# Patient Record
Sex: FEMALE | Race: WHITE | NOT HISPANIC OR LATINO | Employment: FULL TIME | URBAN - METROPOLITAN AREA
[De-identification: names, ages, dates, MRNs, and addresses within clinical notes are randomized per-mention and may not be internally consistent; named-entity substitution may affect disease eponyms.]

---

## 2024-11-04 ENCOUNTER — OFFICE VISIT (OUTPATIENT)
Dept: FAMILY MEDICINE CLINIC | Facility: CLINIC | Age: 29
End: 2024-11-04
Payer: COMMERCIAL

## 2024-11-04 VITALS
TEMPERATURE: 98.5 F | WEIGHT: 254.9 LBS | RESPIRATION RATE: 16 BRPM | OXYGEN SATURATION: 96 % | HEART RATE: 79 BPM | HEIGHT: 65 IN | BODY MASS INDEX: 42.47 KG/M2

## 2024-11-04 DIAGNOSIS — Z86.39 HISTORY OF IRON DEFICIENCY: ICD-10-CM

## 2024-11-04 DIAGNOSIS — H92.03 EAR PAIN, BILATERAL: ICD-10-CM

## 2024-11-04 DIAGNOSIS — Z00.00 ENCOUNTER FOR MEDICAL EXAMINATION TO ESTABLISH CARE: Primary | ICD-10-CM

## 2024-11-04 DIAGNOSIS — F41.1 GENERALIZED ANXIETY DISORDER: ICD-10-CM

## 2024-11-04 DIAGNOSIS — F33.0 MILD EPISODE OF RECURRENT MAJOR DEPRESSIVE DISORDER: ICD-10-CM

## 2024-11-04 RX ORDER — FAMOTIDINE 20 MG/1
20 TABLET, FILM COATED ORAL
COMMUNITY
End: 2024-11-04

## 2024-11-04 RX ORDER — FLUTICASONE PROPIONATE 50 MCG
1 SPRAY, SUSPENSION (ML) NASAL
COMMUNITY
End: 2024-11-04

## 2024-11-04 RX ORDER — OMEPRAZOLE 40 MG/1
40 CAPSULE, DELAYED RELEASE ORAL DAILY
COMMUNITY

## 2024-11-04 RX ORDER — CETIRIZINE HYDROCHLORIDE 10 MG/1
10 TABLET ORAL DAILY
COMMUNITY

## 2024-11-04 RX ORDER — HYOSCYAMINE SULFATE 0.125 MG
125 TABLET ORAL EVERY 4 HOURS PRN
COMMUNITY

## 2024-11-04 RX ORDER — ESCITALOPRAM OXALATE 10 MG/1
10 TABLET ORAL DAILY
COMMUNITY
Start: 2024-04-25 | End: 2024-11-04

## 2024-11-10 NOTE — PROGRESS NOTES
"Chief Complaint  Establish Care, Anxiety (LEXAPRO 10 MG, NOT HELPING. ), Depression, Ear Fullness, and Earache    Subjective    History of Present Illness  The patient is a 29-year-old female here for her first visit to establish medical care.    She has a history of allergies and is currently taking Zyrtec. Despite receiving allergy injections, she continues to experience ear discomfort, which is thought to be allergy-related. She has also experienced sharp, dull pain in both ears for the past week and a half, along with fluid accumulation and mild congestion. She has used Flonase in the past.    She reports difficulty swallowing food and was prescribed hyoscyamine by her gastroenterologist, Dr. Roberts, for stomach spasms. Her stomach issues are now under control with the medication. She does not report any abdominal pain, nausea, vomiting, urinary or bowel issues, or leg swelling.    She has a history of anxiety and depression. She was previously taking Lexapro 10 mg daily but discontinued it due to lack of effectiveness. She was then prescribed Xanax twice daily but is not currently taking it. She experiences constant anxiety, heart palpitations, restlessness, and racing thoughts. She is currently undergoing EMDR therapy for past trauma. She reports no suicidal ideation or attempts.    She has not had recent lab tests and requests an iron panel due to a history of iron deficiency and previous infusions. She is also taking omeprazole daily for acid reflux.    FAMILY HISTORY  Anxiety runs in her family.       Makayla Zamudio presents to [facility]  Anxiety  Her past medical history is significant for depression.   Depression  Her past medical history is significant for depression.   Ear Fullness     Earache         Objective   Vital Signs:  Pulse 79   Temp 98.5 °F (36.9 °C) (Oral)   Resp 16   Ht 165.1 cm (65\")   Wt 116 kg (254 lb 14.4 oz)   SpO2 96%   BMI 42.42 kg/m² " "  Estimated body mass index is 42.42 kg/m² as calculated from the following:    Height as of this encounter: 165.1 cm (65\").    Weight as of this encounter: 116 kg (254 lb 14.4 oz).    Class 3 Severe Obesity (BMI >=40). Obesity-related health conditions include the following: GERD. Obesity is newly identified. BMI is is above average; BMI management plan is completed. We discussed portion control and increasing exercise.      Physical Exam  Cardiovascular:      Rate and Rhythm: Normal rate.      Pulses: Normal pulses.      Heart sounds: Normal heart sounds.   Pulmonary:      Effort: Pulmonary effort is normal.      Breath sounds: Normal breath sounds.   Skin:     General: Skin is warm.   Neurological:      Mental Status: She is alert and oriented to person, place, and time.        Result Review :                Assessment and Plan   Diagnoses and all orders for this visit:    1. Encounter for medical examination to establish care (Primary)    2. Generalized anxiety disorder  -     FLUoxetine (PROzac) 20 MG capsule; Take 1 capsule by mouth Daily.  Dispense: 90 capsule; Refill: 1  -     CBC Auto Differential; Future  -     Comprehensive Metabolic Panel; Future  -     Lipid Panel With / Chol / HDL Ratio; Future  -     TSH; Future  -     Urinalysis With Microscopic - Urine, Clean Catch; Future    3. Mild episode of recurrent major depressive disorder  -     FLUoxetine (PROzac) 20 MG capsule; Take 1 capsule by mouth Daily.  Dispense: 90 capsule; Refill: 1  -     CBC Auto Differential; Future  -     Comprehensive Metabolic Panel; Future  -     Lipid Panel With / Chol / HDL Ratio; Future  -     TSH; Future  -     Urinalysis With Microscopic - Urine, Clean Catch; Future    4. History of iron deficiency  -     Iron and TIBC; Future  -     Ferritin; Future    5. Ear pain, bilateral        Assessment & Plan  1. Generalized anxiety disorder.  Her symptoms suggest generalized anxiety disorder, characterized by heart palpitations " and constant worry. Citalopram is not recommended due to potential weight gain. Fluoxetine, a weight-neutral drug, is a suitable alternative. A prescription for fluoxetine 20 mg, 90 capsules with one refill, will be provided. She is advised to take this medication for at least 4 weeks to assess its effectiveness. She reports no suicidal thoughts or attempts.    2. Ear pain.  Her ear symptoms, including sharp and dull pain, are likely related to allergies rather than an infection. Examination showed no signs of infection or redness, and her eardrums appeared normal. Flonase nasal spray is recommended to help with her allergy symptoms.    3. Health Maintenance.  Fasting labs, including CBC, CMP, lipid panel, thyroid, urine analysis, TIBC, and ferritin, will be ordered prior to her next visit to monitor her overall health and ensure her iron levels are adequate before returning to [facility].    Follow-up  Return in 3 months for follow up.            Follow Up   No follow-ups on file.  Patient was given instructions and counseling regarding her condition or for health maintenance advice. Please see specific information pulled into the AVS if appropriate.     Patient or patient representative verbalized consent for the use of Ambient Listening during the visit with  Yomi Bedoya MD for chart documentation. 11/10/2024  18:00 EST

## 2024-11-13 ENCOUNTER — PATIENT MESSAGE (OUTPATIENT)
Dept: FAMILY MEDICINE CLINIC | Facility: CLINIC | Age: 29
End: 2024-11-13
Payer: COMMERCIAL

## 2024-12-17 ENCOUNTER — OFFICE VISIT (OUTPATIENT)
Dept: FAMILY MEDICINE CLINIC | Facility: CLINIC | Age: 29
End: 2024-12-17
Payer: COMMERCIAL

## 2024-12-17 VITALS
HEART RATE: 73 BPM | OXYGEN SATURATION: 99 % | RESPIRATION RATE: 16 BRPM | TEMPERATURE: 98.5 F | WEIGHT: 253 LBS | HEIGHT: 65 IN | BODY MASS INDEX: 42.15 KG/M2 | DIASTOLIC BLOOD PRESSURE: 90 MMHG | SYSTOLIC BLOOD PRESSURE: 110 MMHG

## 2024-12-17 DIAGNOSIS — F33.0 MILD EPISODE OF RECURRENT MAJOR DEPRESSIVE DISORDER: ICD-10-CM

## 2024-12-17 DIAGNOSIS — Z30.011 ENCOUNTER FOR ORAL CONTRACEPTION INITIAL PRESCRIPTION: ICD-10-CM

## 2024-12-17 DIAGNOSIS — F41.1 GENERALIZED ANXIETY DISORDER: Primary | ICD-10-CM

## 2024-12-17 PROCEDURE — 99214 OFFICE O/P EST MOD 30 MIN: CPT | Performed by: STUDENT IN AN ORGANIZED HEALTH CARE EDUCATION/TRAINING PROGRAM

## 2024-12-17 RX ORDER — NORGESTIMATE AND ETHINYL ESTRADIOL 7DAYSX3 28
1 KIT ORAL DAILY
Start: 2024-12-17 | End: 2024-12-23

## 2024-12-17 RX ORDER — FLUOXETINE 40 MG/1
40 CAPSULE ORAL DAILY
Qty: 90 CAPSULE | Refills: 3 | Status: SHIPPED | OUTPATIENT
Start: 2024-12-17

## 2024-12-18 LAB — B-HCG SERPL QL: NEGATIVE MIU/ML

## 2024-12-23 RX ORDER — NORGESTIMATE AND ETHINYL ESTRADIOL 7DAYSX3 28
1 KIT ORAL DAILY
Qty: 28 TABLET | Refills: 12 | Status: SHIPPED | OUTPATIENT
Start: 2024-12-23 | End: 2025-12-23

## 2024-12-24 NOTE — PROGRESS NOTES
Chief Complaint  Medication Problem (POSSIBLY CHANGE DOSAGE ON PROZAC.), Menstrual Problem (BLEEDING FOR 3 WEEKS, NEED TO GO BACK ON BIRTH CONTROL PILLS.), and Med Refill (GOING TO [place], NEEDS RX; MALARONE. TO AVOID MALARIA.)    Subjective    History of Present Illness  The patient is a 29-year-old female who presents for evaluation of anxiety, depression, irregular menstrual cycle, and for malaria prophylaxis.    She reports that her current regimen of Prozac 20 mg daily, prescribed for both anxiety and depression, has been beneficial. However, she expresses a desire to increase the dosage slightly. She is not experiencing any side effects from the medication and does not endorse any suicidal ideation. She is not sexually active and has no history of glaucoma or other ocular conditions.    She has been experiencing prolonged menstrual bleeding for the past 3 weeks. She was previously on birth control, which she discontinued due to concerns about potential future fertility issues. She had been on birth control since 2012, initially prescribed by her gynecologist, who has since relocated. She acknowledges that her history of anemia may be contributing to her current symptoms. She reports no history of smoking or thromboembolic events. She was on Tri-Marge birth control for irregular periods. She used to have regular periods while on Tri-Marge.    She is planning a return trip to [place] and is seeking a prescription for Malarone as a prophylactic measure against malaria. Her previous experience with doxycycline was marred by daily nausea, prompting her preference for Malarone, which she has tolerated well in the past. She reports no renal issues.    SOCIAL HISTORY  She does not smoke.    MEDICATIONS  Current: Prozac  Past: Tri-Marge, Malarone, doxycycline       Makayla Zamudio presents to [facility]  Menstrual Problem      Objective   Vital Signs:  /90 (BP Location: Left arm,  "Patient Position: Sitting, Cuff Size: Large Adult)   Pulse 73   Temp 98.5 °F (36.9 °C) (Oral)   Resp 16   Ht 165.1 cm (65\")   Wt 115 kg (253 lb)   SpO2 99%   BMI 42.10 kg/m²   Estimated body mass index is 42.1 kg/m² as calculated from the following:    Height as of this encounter: 165.1 cm (65\").    Weight as of this encounter: 115 kg (253 lb).            Physical Exam  Cardiovascular:      Rate and Rhythm: Normal rate.      Pulses: Normal pulses.      Heart sounds: Normal heart sounds.   Pulmonary:      Effort: Pulmonary effort is normal.      Breath sounds: Normal breath sounds.   Abdominal:      General: Bowel sounds are normal.      Palpations: Abdomen is soft.   Skin:     General: Skin is warm.   Neurological:      Mental Status: She is alert and oriented to person, place, and time.        Result Review :  The following data was reviewed by: Yomi Bedoya MD on 12/17/2024:  CMP          3/7/2024    22:57   CMP   Glucose 100       BUN 7       Creatinine 0.5       Sodium 137       Potassium 3.7       Chloride 101       Calcium 9.5       Albumin 4.3       Total Bilirubin 0.59       Alkaline Phosphatase 80       AST (SGOT) 84       ALT (SGPT) 86       Anion Gap 10          Details          This result is from an external source.                             Assessment and Plan   Diagnoses and all orders for this visit:    1. Generalized anxiety disorder (Primary)  -     FLUoxetine (PROzac) 40 MG capsule; Take 1 capsule by mouth Daily.  Dispense: 90 capsule; Refill: 3    2. Mild episode of recurrent major depressive disorder  -     FLUoxetine (PROzac) 40 MG capsule; Take 1 capsule by mouth Daily.  Dispense: 90 capsule; Refill: 3    3. Encounter for oral contraception initial prescription  -     Discontinue: norgestimate-ethinyl estradiol (Tri-Marge) 0.18/0.215/0.25 MG-35 MCG per tablet; Take 1 tablet by mouth Daily.  -     hCG, Serum, Qualitative  -     norgestimate-ethinyl estradiol (ORTHO " TRI-CYCLEN,TRINESSA) 0.18/0.215/0.25 MG-35 MCG per tablet; Take 1 tablet by mouth Daily.  Dispense: 28 tablet; Refill: 12        Assessment & Plan  1. Anxiety and depression.  She is currently on Prozac 20 mg daily and reports it is helping but feels she needs a higher dosage. The dosage will be increased to 40 mg daily. A prescription for Prozac 40 mg capsules, with 3 refills, will be sent to [facility] pharmacy in [place]. Potential side effects, including suicidal ideation, sexual dysfunction, bruising, and upper GI hemorrhage, were discussed.    2. Irregular menstrual cycle.  She has been experiencing prolonged bleeding for 3 weeks after discontinuing her birth control. She is advised to avoid NSAIDs like ibuprofen due to the increased risk of bleeding. A blood test will be conducted to rule out pregnancy. She will be prescribed Tri-Marge, a multiphasic combination birth control pill, with refills for 6 months. She is advised to consult an OB-GYN if any complications arise.    3. Malaria prophylaxis.  She requested a prescription for Malarone for malaria prevention during her upcoming 1.5-year stay in [place]. Due to her weight (115 kg), Malarone may not be the best option as it might not provide 100% benefit and could require monitoring. Doxycycline was considered but previously caused her nausea. She is advised to consult local doctors in [place] for appropriate malaria prophylaxis.    4. Elevated blood pressure.  Her diastolic blood pressure is slightly elevated today. She is advised to monitor her blood pressure regularly and ensure the diastolic reading remains below 85.            Follow Up   No follow-ups on file.  Patient was given instructions and counseling regarding her condition or for health maintenance advice. Please see specific information pulled into the AVS if appropriate.     Patient or patient representative verbalized consent for the use of Ambient Listening during the visit with  Yomi Bedoya MD  for chart documentation. 12/24/2024  17:17 EST

## 2025-01-08 DIAGNOSIS — F41.1 GENERALIZED ANXIETY DISORDER: ICD-10-CM

## 2025-01-08 DIAGNOSIS — F33.0 MILD EPISODE OF RECURRENT MAJOR DEPRESSIVE DISORDER: ICD-10-CM

## 2025-01-08 RX ORDER — FLUOXETINE 40 MG/1
40 CAPSULE ORAL DAILY
Qty: 90 CAPSULE | Refills: 3 | Status: SHIPPED | OUTPATIENT
Start: 2025-01-08

## 2025-01-21 ENCOUNTER — OFFICE VISIT (OUTPATIENT)
Dept: FAMILY MEDICINE CLINIC | Facility: CLINIC | Age: 30
End: 2025-01-21
Payer: COMMERCIAL

## 2025-01-21 VITALS
DIASTOLIC BLOOD PRESSURE: 80 MMHG | SYSTOLIC BLOOD PRESSURE: 118 MMHG | TEMPERATURE: 98.7 F | OXYGEN SATURATION: 98 % | HEART RATE: 83 BPM | WEIGHT: 254.4 LBS | HEIGHT: 65 IN | BODY MASS INDEX: 42.38 KG/M2 | RESPIRATION RATE: 16 BRPM

## 2025-01-21 DIAGNOSIS — Z87.42 HISTORY OF PCOS: ICD-10-CM

## 2025-01-21 DIAGNOSIS — N92.1 MENORRHAGIA WITH IRREGULAR CYCLE: Primary | ICD-10-CM

## 2025-01-21 DIAGNOSIS — H92.03 CHRONIC EAR PAIN, BILATERAL: ICD-10-CM

## 2025-01-21 DIAGNOSIS — G89.29 CHRONIC EAR PAIN, BILATERAL: ICD-10-CM

## 2025-01-21 PROCEDURE — 99214 OFFICE O/P EST MOD 30 MIN: CPT | Performed by: STUDENT IN AN ORGANIZED HEALTH CARE EDUCATION/TRAINING PROGRAM

## 2025-01-21 RX ORDER — PREDNISONE 20 MG/1
2 TABLET ORAL DAILY
COMMUNITY
Start: 2024-12-29 | End: 2025-01-21

## 2025-01-22 NOTE — PROGRESS NOTES
Chief Complaint  Earache (POSITIVE; BILATERAL. WAS DX WITH DOUBLE EAR INFECTION./POSITIVE; COVID AND FLU), Menstrual Problem (ARE WORSE,  BIRTH CONTROLS ARE NOT WORKING.), and Pelvic Pain (CRAMPING.)    Subjective    History of Present Illness  The patient is a 29-year-old female who presents for evaluation of acute issues.    She was diagnosed with an ear infection approximately a month ago, but continues to experience discomfort. She reports persistent pain and a sensation of fluid accumulation in her ears, which she attributes to congestion. These symptoms have been ongoing since Christmas, when she was diagnosed with COVID-19, influenza, and a bilateral ear infection. Despite using Flonase, as recommended during her last visit in 11/2024, her symptoms have not improved. She does not report any sore throat or dental issues but notes frequent ear popping when blowing her nose. She has previously consulted an ENT specialist and is open to scheduling another appointment if necessary.    She has been experiencing irregular menstrual cycles, with prolonged bleeding for 3 weeks following the discontinuation of her birth control. A blood test confirmed that she is not pregnant. Her bleeding ceased for a week and a half before resuming heavily, accompanied by large blood clots and severe cramping. The bleeding has persisted and is now extremely heavy. She reports no history of fibroids but has been diagnosed with PCOS. She was previously prescribed metformin, but it was discontinued due to severe nausea. She has been dealing with these issues for years, and they have progressively worsened. Her current bleeding is painful. Her last menstrual period started on 01/15/2025, earlier than expected based on her birth control schedule. She has been changing her pad at least 10 times a day due to the heavy bleeding. She discontinued her multiphasic combination birth control pill 2 days ago in an attempt to stop the bleeding, but  "this has not resulted in any change.    FAMILY HISTORY  Her mother has PCOS.    MEDICATIONS  Current: Yannick Zamudio presents to [facility]  Earache     Menstrual Problem  Pelvic Pain  The patient's primary symptoms include pelvic pain.       Objective   Vital Signs:  /80   Pulse 83   Temp 98.7 °F (37.1 °C) (Oral)   Resp 16   Ht 163.8 cm (64.5\")   Wt 115 kg (254 lb 6.4 oz)   SpO2 98%   BMI 42.99 kg/m²   Estimated body mass index is 42.99 kg/m² as calculated from the following:    Height as of this encounter: 163.8 cm (64.5\").    Weight as of this encounter: 115 kg (254 lb 6.4 oz).            Physical Exam  Cardiovascular:      Rate and Rhythm: Normal rate.      Pulses: Normal pulses.      Heart sounds: Normal heart sounds.   Pulmonary:      Effort: Pulmonary effort is normal.      Breath sounds: Normal breath sounds.   Abdominal:      General: Bowel sounds are normal.      Palpations: Abdomen is soft.   Skin:     General: Skin is warm.   Neurological:      General: No focal deficit present.      Mental Status: She is alert and oriented to person, place, and time.        Result Review :                Assessment and Plan   Diagnoses and all orders for this visit:    1. Menorrhagia with irregular cycle (Primary)  -     US Pelvis Complete  -     Ambulatory Referral to Gynecology    2. History of PCOS  -     Ambulatory Referral to Gynecology    3. Chronic ear pain, bilateral        Assessment & Plan  1. Ear infection.  Her ear examination revealed no abnormalities, and her throat was not erythematous. She was previously diagnosed with COVID-19, influenza, and a bilateral ear infection on Brian day. Given the absence of significant findings on examination, antibiotics are not recommended at this time. She is advised to consult an ENT specialist for a second opinion if she wishes.    2. Menorrhagia.  She has been experiencing heavy menstrual bleeding with " large blood clots and significant cramping. Her bleeding started on 01/15/2025 and has persisted despite discontinuing her multiphasic combination birth control pill two days ago. She has a history of PCOS. A referral to an OB/GYN specialist will be made for further evaluation. A complete pelvic ultrasound will be ordered to assess for potential fibroids. The patient prefers to do this test at [facility], [facility].    Follow-up  The patient is scheduled for a follow-up visit in 02/2025.            Follow Up   No follow-ups on file.  Patient was given instructions and counseling regarding her condition or for health maintenance advice. Please see specific information pulled into the AVS if appropriate.     Patient or patient representative verbalized consent for the use of Ambient Listening during the visit with  Yomi Bedoya MD for chart documentation. 1/22/2025  12:46 EST            Answers submitted by the patient for this visit:  Primary Reason for Visit (Submitted on 1/20/2025)  What is the primary reason for your visit?: Birth Control

## 2025-02-20 ENCOUNTER — OFFICE VISIT (OUTPATIENT)
Dept: OBSTETRICS AND GYNECOLOGY | Age: 30
End: 2025-02-20
Payer: COMMERCIAL

## 2025-02-20 VITALS
SYSTOLIC BLOOD PRESSURE: 118 MMHG | BODY MASS INDEX: 43.09 KG/M2 | HEIGHT: 64 IN | DIASTOLIC BLOOD PRESSURE: 92 MMHG | WEIGHT: 252.4 LBS

## 2025-02-20 DIAGNOSIS — N93.9 ABNORMAL UTERINE BLEEDING (AUB): Primary | ICD-10-CM

## 2025-02-20 RX ORDER — FLUTICASONE PROPIONATE 50 MCG
1 SPRAY, SUSPENSION (ML) NASAL DAILY
COMMUNITY

## 2025-02-20 NOTE — PROGRESS NOTES
[facility]   Obstetrics and Gynecology   New Gynecology Visit    2025    Patient: Makayla Zamudio          MR#:[id number]    History of Present Illness    Chief Complaint   Patient presents with    Gynecologic Exam     CC: new gyn. Irregular cycle. U/s @8. Last pap 19 (-).       29 y.o. female  who presents for discussion about irregular periods.  She states that she has always had heavy periods but recently they have become increasingly irregular and she recently had bleeding for 40 days.  She has tried OCPs but those have not improved her cycles.    Studies reviewed: US Non-ob Transvaginal (2025 08:23)       Obstetric History:  OB History          0    Para   0    Term   0       0    AB   0    Living   0         SAB   0    IAB   0    Ectopic   0    Molar   0    Multiple   0    Live Births   0               Menstrual History:     Patient's last menstrual period was 2025 (approximate).       Sexual History:         ________________________________________  Patient Active Problem List   Diagnosis    Abnormal uterine bleeding (AUB)     Past Medical History:   Diagnosis Date    Anemia     Anxiety     Asthma     Depression     Ear infection     IBS (irritable bowel syndrome)     Kidney stone     Migraine     Ovarian cyst     Polycystic ovary syndrome     Urinary tract infection      Past Surgical History:   Procedure Laterality Date    ADENOIDECTOMY  2019    CHOLECYSTECTOMY  2012    TONSILLECTOMY  2019    WISDOM TOOTH EXTRACTION  2013     Social History     Tobacco Use   Smoking Status Never    Passive exposure: Never   Smokeless Tobacco Never     Family History   Problem Relation Age of Onset    No Known Problems Mother     Diabetes Father     Prostate cancer Maternal Grandfather     Uterine cancer Paternal Grandmother      Prior to Admission medications    Medication Sig Start Date End Date Taking? Authorizing Provider   FLUoxetine (PROzac) 40 MG capsule Take  "1 capsule by mouth Daily. 1/8/25  Yes Yomi Bedoya MD   omeprazole (priLOSEC) 40 MG capsule Take 1 capsule by mouth Daily.   Yes Kendy Villar MD   cetirizine (zyrTEC) 10 MG tablet Take 1 tablet by mouth Daily.  Patient not taking: Reported on 2/20/2025    Kendy Villar MD   fluticasone (FLONASE) 50 MCG/ACT nasal spray Administer 1 spray into the nostril(s) as directed by provider Daily.  Patient not taking: Reported on 2/20/2025    Kendy Villar MD   hyoscyamine (ANASPAZ,LEVSIN) 0.125 MG tablet Take 1 tablet by mouth Every 4 (Four) Hours As Needed.  Patient not taking: Reported on 2/20/2025    Kendy Villar MD   norgestimate-ethinyl estradiol (ORTHO TRI-CYCLEN,TRINESSA) 0.18/0.215/0.25 MG-35 MCG per tablet Take 1 tablet by mouth Daily.  Patient not taking: Reported on 2/20/2025 12/23/24 12/23/25  Yomi Bedoya MD     ________________________________________    The following portions of the patient's history were reviewed and updated as appropriate: allergies, current medications, past family history, past medical history, past social history, past surgical history, and problem list.           Objective     /92   Ht 163 cm (64.17\")   Wt 114 kg (252 lb 6.4 oz)   LMP 02/11/2025 (Approximate)   BMI 43.09 kg/m²    BP Readings from Last 3 Encounters:   02/20/25 118/92   01/21/25 118/80   12/17/24 110/90      Wt Readings from Last 3 Encounters:   02/20/25 114 kg (252 lb 6.4 oz)   01/21/25 115 kg (254 lb 6.4 oz)   12/17/24 115 kg (253 lb)        BMI: Estimated body mass index is 43.09 kg/m² as calculated from the following:    Height as of this encounter: 163 cm (64.17\").    Weight as of this encounter: 114 kg (252 lb 6.4 oz).    Physical Exam  Vitals and nursing note reviewed.   Constitutional:       Appearance: Normal appearance.   HENT:      Head: Normocephalic and atraumatic.   Pulmonary:      Effort: Pulmonary effort is normal.   Neurological:      Mental Status: She is " alert and oriented to person, place, and time.   Psychiatric:         Mood and Affect: Mood normal.                 Assessment:  29 y.o. female  who presents for discussion about irregular periods.   Diagnoses and all orders for this visit:    1. Abnormal uterine bleeding (AUB) (Primary)  Overview:  - We discussed various causes for abnormal uterine bleeding including structural, endometrial hyperplasia or malignancy, endometrial and ovulatory dysfunction.  Patient does have a history of PCOS though she has usually had periods each month.  -We discussed expectant, medical, and surgical management.  Patient is currently interested in hysterectomy and has failed multiple trials of medical management.  -I discussed with her that we would need a Pap smear and endometrial sampling in order to move forward with a hysterectomy.  She is willing to do a Pap smear and try an EMB at her next visit.  She has had difficulty with IUD placement in the past so there is some concern that an EMB will not be possible in the office in which case she is willing to undergo hysteroscopy dilation and curettage prior to her hysterectomy.  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  -She is aware that a hysterectomy would make it so that she absolutely could not carry a pregnancy in the future and she has already decided that pregnancy is not something that she desires.  -We will schedule her for a Pap smear and EMB as well as potentially for hysteroscopy D&C.  Once this evaluation is complete we will go forward to scheduling hysterectomy if everything is normal.              Plan:  No follow-ups on file.      Niki Spann MD  2025 10:08 EST

## 2025-02-20 NOTE — PROGRESS NOTES
[facility]   Obstetrics and Gynecology     2025    Patient: Makayla Zamudio          MR#:[id number]    History of Present Illness    Chief Complaint   Patient presents with    Gynecologic Exam     CC: new gyn. Irregular cycle. U/s @8. Last pap 19 (-).       29 y.o. female  who presents for annual exam.    Relevant data reviewed:    Patient's last menstrual period was 2025 (approximate).  Obstetric History:  OB History          0    Para   0    Term   0       0    AB   0    Living   0         SAB   0    IAB   0    Ectopic   0    Molar   0    Multiple   0    Live Births   0               Menstrual History:     Patient's last menstrual period was 2025 (approximate).       Social History     Substance and Sexual Activity   Sexual Activity Never     ______________________________________  There is no problem list on file for this patient.    Past Medical History:   Diagnosis Date    Anemia     Anxiety     Asthma     Depression     Ear infection     IBS (irritable bowel syndrome)     Kidney stone     Migraine     Ovarian cyst     Polycystic ovary syndrome     Urinary tract infection      Past Surgical History:   Procedure Laterality Date    ADENOIDECTOMY  2019    CHOLECYSTECTOMY  2012    TONSILLECTOMY  2019    WISDOM TOOTH EXTRACTION  2013     Social History     Tobacco Use   Smoking Status Never    Passive exposure: Never   Smokeless Tobacco Never     Family History   Problem Relation Age of Onset    No Known Problems Mother     Diabetes Father     Prostate cancer Maternal Grandfather     Uterine cancer Paternal Grandmother      Prior to Admission medications    Medication Sig Start Date End Date Taking? Authorizing Provider   FLUoxetine (PROzac) 40 MG capsule Take 1 capsule by mouth Daily. 25  Yes Yomi Bedoya MD   omeprazole (priLOSEC) 40 MG capsule Take 1 capsule by mouth Daily.   Yes ProviderKendy MD   cetirizine (zyrTEC) 10 MG tablet Take 1  "tablet by mouth Daily.  Patient not taking: Reported on 2025    Kendy Villar MD   fluticasone (FLONASE) 50 MCG/ACT nasal spray Administer 1 spray into the nostril(s) as directed by provider Daily.  Patient not taking: Reported on 2025    Kendy Villar MD   hyoscyamine (ANASPAZ,LEVSIN) 0.125 MG tablet Take 1 tablet by mouth Every 4 (Four) Hours As Needed.  Patient not taking: Reported on 2025    Kendy Villar MD   norgestimate-ethinyl estradiol (ORTHO TRI-CYCLEN,TRINESSA) 0.18/0.215/0.25 MG-35 MCG per tablet Take 1 tablet by mouth Daily.  Patient not taking: Reported on 24  Yomi Bedoya MD     _______________________________________    Current contraception: {contraceptive method:5051}  History of abnormal Pap smear: {yes***/no:39038}  Family history of uterine or ovarian cancer: {yes***/no:43877}  Family History of colon cancer/colon polyps: {yes**/no:74992}  History of abnormal mammogram: {yes***/no:09542}  History of abnormal lipids: {yes***/no:46849}    {Common ambulatory SmartLinks:23354}        {ROS - COMPLETE + GYN:8274268796}       Objective   Physical Exam    /92   Ht 163 cm (64.17\")   Wt 114 kg (252 lb 6.4 oz)   LMP 2025 (Approximate)   BMI 43.09 kg/m²    BP Readings from Last 3 Encounters:   25 118/92   25 118/80   24 110/90      Wt Readings from Last 3 Encounters:   25 114 kg (252 lb 6.4 oz)   25 115 kg (254 lb 6.4 oz)   24 115 kg (253 lb)        BMI: Estimated body mass index is 43.09 kg/m² as calculated from the following:    Height as of this encounter: 163 cm (64.17\").    Weight as of this encounter: 114 kg (252 lb 6.4 oz).    As part of wellness and prevention, the following topics were discussed with the patient:  {HEB annual gen counseling :79792}        Problem List   Meds  History  Prep for Surg   Imagin}    Assessment:  29 y.o. female  who presents for annual " exam.  There are no diagnoses linked to this encounter.  Plan:  No follow-ups on file.    Niki Spann MD  2/20/2025 08:48 EST